# Patient Record
Sex: MALE | URBAN - METROPOLITAN AREA
[De-identification: names, ages, dates, MRNs, and addresses within clinical notes are randomized per-mention and may not be internally consistent; named-entity substitution may affect disease eponyms.]

---

## 2017-08-15 ENCOUNTER — IMPORTED ENCOUNTER (OUTPATIENT)
Dept: URBAN - METROPOLITAN AREA CLINIC 31 | Facility: CLINIC | Age: 77
End: 2017-08-15

## 2017-08-15 PROBLEM — H35.3131: Noted: 2017-08-15

## 2017-08-15 PROBLEM — H25.813: Noted: 2017-08-15

## 2017-08-15 PROCEDURE — 99204 OFFICE O/P NEW MOD 45 MIN: CPT

## 2017-08-15 NOTE — PATIENT DISCUSSION
1.  Discussed the risks benefits alternatives and limitations of cataract surgery including infection bleeding loss of vision retinal tears detachment. The patient stated a full understanding and a desire to proceed with the procedure in both eyes. Refractive options were reviewed. Patient has elected to be optimized for distance vision in both eyes. The patient will still need glasses for reading and to possibly fine tune distance vision. 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 ARMD OU dry - Importance of smoking cessation blood pressure control and healthy diet were emphasized. In accordance with the AREDS study a good multivitamin containing EC and Zinc were recommened to be taken daily. Patient was instructed to self monitor their monocular vision (reading/[employer] Grid) at least weekly. Patient should immediately report any new onset of decreased vision or metamorphopsia. 3. Return for an appointment for [address] [place] [address] with Dr. Adrian Jarvis.

## 2017-10-03 ENCOUNTER — IMPORTED ENCOUNTER (OUTPATIENT)
Dept: URBAN - METROPOLITAN AREA CLINIC 31 | Facility: CLINIC | Age: 77
End: 2017-10-03

## 2017-10-03 PROBLEM — H25.813: Noted: 2017-10-03

## 2017-10-03 PROCEDURE — 76519 ECHO EXAM OF EYE: CPT

## 2017-10-17 ENCOUNTER — IMPORTED ENCOUNTER (OUTPATIENT)
Dept: URBAN - METROPOLITAN AREA CLINIC 31 | Facility: CLINIC | Age: 77
End: 2017-10-17

## 2017-10-17 PROBLEM — Z96.1: Noted: 2017-10-17

## 2017-10-17 PROCEDURE — 99024 POSTOP FOLLOW-UP VISIT: CPT

## 2017-10-17 NOTE — PATIENT DISCUSSION
Post-Op Day #1 - Cataract Surgery Right Eye (OD) - doing well. Tears prn. Continue postop drops as directed. Call office with symptoms of pain redness or decreased vision in operative eye. 1 drop zylet instilled. Ok to proceed with fellow eye. Return for an appointment in 1 week for post op refraction. with Dr. Jens Powell.

## 2017-10-25 ENCOUNTER — IMPORTED ENCOUNTER (OUTPATIENT)
Dept: URBAN - METROPOLITAN AREA CLINIC 31 | Facility: CLINIC | Age: 77
End: 2017-10-25

## 2017-10-25 PROBLEM — Z96.1: Noted: 2017-10-25

## 2017-10-25 PROCEDURE — 99024 POSTOP FOLLOW-UP VISIT: CPT

## 2017-10-25 NOTE — PATIENT DISCUSSION
Post-Op Week #1 - Cataract Surgery Right Eye (OD) - Intraocular lens stable and surgery very well healed. Patient to resume all normal activities. Finish postop drops as directed. Final Refraction given if necessary. Call with any problems.

## 2017-10-31 ENCOUNTER — IMPORTED ENCOUNTER (OUTPATIENT)
Dept: URBAN - METROPOLITAN AREA CLINIC 31 | Facility: CLINIC | Age: 77
End: 2017-10-31

## 2017-10-31 PROBLEM — Z96.1: Noted: 2017-10-31

## 2017-10-31 PROCEDURE — 99024 POSTOP FOLLOW-UP VISIT: CPT

## 2017-10-31 NOTE — PATIENT DISCUSSION
1.  Post-Op Day #1 - Cataract Surgery Left Eye (OS) - doing well. Tears prn. Continue postop drops as directed. Call office with symptoms of pain redness or decreased vision in operative eye.  1 drop of zylet instilled2. Post-Op Week #2 -  Cataract Surgery Right Eye (OD) - Intraocular lens stable and surgery very well healed. Patient to resume all normal activities. Finish postop drops as directed. Final Refraction given if necessary. 3. Return for an appointment in 1 week for post op exam. MRx. with Dr. Alvarado Rivera.

## 2017-11-07 ENCOUNTER — IMPORTED ENCOUNTER (OUTPATIENT)
Dept: URBAN - METROPOLITAN AREA CLINIC 31 | Facility: CLINIC | Age: 77
End: 2017-11-07

## 2017-11-07 PROBLEM — Z96.1: Noted: 2017-11-07

## 2017-11-07 PROCEDURE — 99024 POSTOP FOLLOW-UP VISIT: CPT

## 2017-11-07 NOTE — PATIENT DISCUSSION
1.  Post-Op Week #1 - Cataract Surgery Left Eye (OS) -  Intraocular lens stable and surgery very well healed. Patient to resume all normal activities. Finish postop drops as directed. Final Refraction given if necessary. Call with any problems. 2. Return for an appointment in 4 months for dilated fundus exam. with Dr. Jens Powell.

## 2018-03-19 ENCOUNTER — IMPORTED ENCOUNTER (OUTPATIENT)
Dept: URBAN - METROPOLITAN AREA CLINIC 31 | Facility: CLINIC | Age: 78
End: 2018-03-19

## 2018-03-19 PROBLEM — Z96.1: Noted: 2018-03-19

## 2018-03-19 PROCEDURE — 99214 OFFICE O/P EST MOD 30 MIN: CPT

## 2020-10-01 ENCOUNTER — OFFICE VISIT (OUTPATIENT)
Age: 80
End: 2020-10-01

## 2021-01-21 ENCOUNTER — OFFICE VISIT (OUTPATIENT)
Dept: URBAN - METROPOLITAN AREA CLINIC 7 | Facility: CLINIC | Age: 81
End: 2021-01-21

## 2021-02-10 ENCOUNTER — OFFICE VISIT (OUTPATIENT)
Dept: URBAN - METROPOLITAN AREA SURGERY CENTER 5 | Facility: SURGERY CENTER | Age: 81
End: 2021-02-10

## 2021-02-12 ENCOUNTER — TELEPHONE ENCOUNTER (OUTPATIENT)
Dept: URBAN - METROPOLITAN AREA CLINIC 9 | Facility: CLINIC | Age: 81
End: 2021-02-12

## 2021-05-18 ENCOUNTER — TELEPHONE ENCOUNTER (OUTPATIENT)
Dept: URBAN - METROPOLITAN AREA CLINIC 9 | Facility: CLINIC | Age: 81
End: 2021-05-18

## 2021-05-19 ENCOUNTER — TELEPHONE ENCOUNTER (OUTPATIENT)
Dept: URBAN - METROPOLITAN AREA CLINIC 9 | Facility: CLINIC | Age: 81
End: 2021-05-19

## 2021-10-27 NOTE — PATIENT DISCUSSION
Let him know that if he starts having any trouble with his contacts that we have room to go up. Since he isn't currently having issues not not making the change.

## 2022-04-02 ASSESSMENT — TONOMETRY
OS_IOP_MMHG: 21
OD_IOP_MMHG: 16
OD_IOP_MMHG: 17
OD_IOP_MMHG: 22
OS_IOP_MMHG: 15
OD_IOP_MMHG: 16
OS_IOP_MMHG: 16
OD_IOP_MMHG: 16
OD_IOP_MMHG: 14
OS_IOP_MMHG: 17

## 2022-04-02 ASSESSMENT — VISUAL ACUITY
OS_CC: 20/400
OD_CC: 20/20-2
OS_PH: SC 20/60 -2
OD_CC: 20/20
OD_CC: 20/30
OS_CC: 20/50
OD_CC: 20/200
OS_CC: 20/100-1
OS_GLARE: 20/50
OS_GLARE: 20/30MED
OS_PH: SC 20/60
OS_CC: 20/400
OU_CC: 20/20
OS_CC: 20/30
OD_GLARE: 20/60
OD_GLARE: 20/40MED
OD_CC: 20/25
OS_PH: SC 20/40 -3
OS_PH: SC 20/25
OD_CC: 20/25-1
OS_CC: 20/70

## 2022-07-30 ENCOUNTER — TELEPHONE ENCOUNTER (OUTPATIENT)
Age: 82
End: 2022-07-30

## 2022-07-30 RX ORDER — OMEPRAZOLE 20 MG/1
1 (ONE) CAPSULE, DELAYED RELEASE ORAL
Qty: 0 | Refills: 16 | OUTPATIENT
Start: 2018-05-17 | End: 2021-01-21

## 2022-07-31 ENCOUNTER — TELEPHONE ENCOUNTER (OUTPATIENT)
Age: 82
End: 2022-07-31

## 2022-07-31 RX ORDER — OMEPRAZOLE 20 MG/1
1 (ONE) CAPSULE, DELAYED RELEASE ORAL
Qty: 0 | Refills: 16 | Status: ACTIVE | COMMUNITY
Start: 2018-05-17

## 2022-07-31 RX ORDER — OMEPRAZOLE 20 MG/1
1 (ONE) CAPSULE, DELAYED RELEASE ORAL
Qty: 0 | Refills: 8 | Status: ACTIVE | COMMUNITY
Start: 2021-05-18

## 2022-07-31 RX ORDER — OMEPRAZOLE 20 MG/1
1 (ONE) CAPSULE, DELAYED RELEASE ORAL
Qty: 0 | Refills: 8 | Status: ACTIVE | COMMUNITY
Start: 2021-05-19

## 2023-01-24 ENCOUNTER — NEW PATIENT (OUTPATIENT)
Dept: URBAN - METROPOLITAN AREA CLINIC 26 | Facility: CLINIC | Age: 83
End: 2023-01-24

## 2023-01-24 VITALS
HEIGHT: 72 IN | HEART RATE: 47 BPM | BODY MASS INDEX: 27.63 KG/M2 | SYSTOLIC BLOOD PRESSURE: 157 MMHG | WEIGHT: 204 LBS | DIASTOLIC BLOOD PRESSURE: 70 MMHG

## 2023-01-24 DIAGNOSIS — H35.443: ICD-10-CM

## 2023-01-24 DIAGNOSIS — H43.813: ICD-10-CM

## 2023-01-24 DIAGNOSIS — H04.123: ICD-10-CM

## 2023-01-24 DIAGNOSIS — H35.3132: ICD-10-CM

## 2023-01-24 DIAGNOSIS — H35.61: ICD-10-CM

## 2023-01-24 PROCEDURE — 99204 OFFICE O/P NEW MOD 45 MIN: CPT

## 2023-01-24 PROCEDURE — 92235 FLUORESCEIN ANGRPH MLTIFRAME: CPT

## 2023-01-24 PROCEDURE — 92134 CPTRZ OPH DX IMG PST SGM RTA: CPT

## 2023-01-24 PROCEDURE — 92250 FUNDUS PHOTOGRAPHY W/I&R: CPT

## 2023-01-24 ASSESSMENT — TONOMETRY
OD_IOP_MMHG: 16
OS_IOP_MMHG: 17

## 2023-01-24 ASSESSMENT — VISUAL ACUITY
OS_PH: 20/30-1
OS_SC: 20/60-2
OD_SC: 20/25-2

## 2023-01-24 NOTE — PATIENT DISCUSSION
1/24/23: Recommended OBSERVATION. Patient understands condition, prognosis and need for follow up care.

## 2023-01-24 NOTE — PATIENT DISCUSSION
1/24/23: EVIDENCE OF A CHOROIDAL ELEVATION ON OCT THAT DOESN'T REPRESENT A MASS ON FA. WE'LL CONTINUE TO MONITOR TO EVAL IF A CHOROIDAL CLEFT FROM AMD? The patient is at high risk for a choroidal neovascular membrane. NO CNV SEEN TODAY. Dry ARMD is responsible for some decrease in vision.

## 2023-01-24 NOTE — PATIENT DISCUSSION
Recommend reevaluation in 3-4 MONTHS to makes sure not a sign of an EVOLVING CONDITIONS. PT IS MILDLY ANEMIC AND BG IS BORDERLINE HIGH. TO MONITOR WITH PCP.

## 2023-01-24 NOTE — PATIENT DISCUSSION
DISCUSSED THE DX, IMAGES, PROGNOSIS AND TX PLAN WITH PATIENT AND WIFE AS INDEPENDENT HISTORIAN. PT REQUIRES SX, RISK OF VI: MOD/HIGH. ALL QUESTIONS WERE ANSWERED.

## 2023-04-24 ENCOUNTER — FOLLOW UP (OUTPATIENT)
Dept: URBAN - METROPOLITAN AREA CLINIC 26 | Facility: CLINIC | Age: 83
End: 2023-04-24

## 2023-04-24 DIAGNOSIS — H35.61: ICD-10-CM

## 2023-04-24 DIAGNOSIS — H35.443: ICD-10-CM

## 2023-04-24 DIAGNOSIS — H35.3132: ICD-10-CM

## 2023-04-24 DIAGNOSIS — H43.813: ICD-10-CM

## 2023-04-24 DIAGNOSIS — H04.123: ICD-10-CM

## 2023-04-24 PROCEDURE — 92250 FUNDUS PHOTOGRAPHY W/I&R: CPT

## 2023-04-24 PROCEDURE — 92012 INTRM OPH EXAM EST PATIENT: CPT

## 2023-04-24 PROCEDURE — 92134 CPTRZ OPH DX IMG PST SGM RTA: CPT

## 2023-04-24 ASSESSMENT — VISUAL ACUITY
OS_PH: 20/50-1
OD_SC: 20/30-2
OS_SC: 20/50-2

## 2023-04-24 ASSESSMENT — TONOMETRY
OD_IOP_MMHG: 19
OS_IOP_MMHG: 15

## 2023-07-10 ENCOUNTER — FOLLOW UP (OUTPATIENT)
Dept: URBAN - METROPOLITAN AREA CLINIC 29 | Facility: CLINIC | Age: 83
End: 2023-07-10

## 2023-07-10 DIAGNOSIS — H04.123: ICD-10-CM

## 2023-07-10 DIAGNOSIS — H02.834: ICD-10-CM

## 2023-07-10 DIAGNOSIS — H02.831: ICD-10-CM

## 2023-07-10 DIAGNOSIS — H43.813: ICD-10-CM

## 2023-07-10 DIAGNOSIS — H26.492: ICD-10-CM

## 2023-07-10 DIAGNOSIS — H35.61: ICD-10-CM

## 2023-07-10 DIAGNOSIS — H35.3132: ICD-10-CM

## 2023-07-10 DIAGNOSIS — H35.443: ICD-10-CM

## 2023-07-10 DIAGNOSIS — Z96.1: ICD-10-CM

## 2023-07-10 PROCEDURE — 99214 OFFICE O/P EST MOD 30 MIN: CPT

## 2023-07-10 ASSESSMENT — TONOMETRY
OS_IOP_MMHG: 14
OD_IOP_MMHG: 15

## 2023-07-10 ASSESSMENT — VISUAL ACUITY
OS_SC: 20/80-1
OD_SC: 20/25+2

## 2024-02-08 ENCOUNTER — FOLLOW UP (OUTPATIENT)
Dept: URBAN - METROPOLITAN AREA CLINIC 26 | Facility: CLINIC | Age: 84
End: 2024-02-08

## 2024-02-08 VITALS — BODY MASS INDEX: 28.44 KG/M2 | HEIGHT: 72 IN | WEIGHT: 210 LBS

## 2024-02-08 DIAGNOSIS — H43.813: ICD-10-CM

## 2024-02-08 DIAGNOSIS — H35.61: ICD-10-CM

## 2024-02-08 DIAGNOSIS — H02.831: ICD-10-CM

## 2024-02-08 DIAGNOSIS — D31.32: ICD-10-CM

## 2024-02-08 DIAGNOSIS — H35.443: ICD-10-CM

## 2024-02-08 DIAGNOSIS — H35.3132: ICD-10-CM

## 2024-02-08 DIAGNOSIS — H02.834: ICD-10-CM

## 2024-02-08 DIAGNOSIS — H35.3221: ICD-10-CM

## 2024-02-08 DIAGNOSIS — H04.123: ICD-10-CM

## 2024-02-08 DIAGNOSIS — Z96.1: ICD-10-CM

## 2024-02-08 PROCEDURE — 92134 CPTRZ OPH DX IMG PST SGM RTA: CPT

## 2024-02-08 PROCEDURE — 67028 INJECTION EYE DRUG: CPT

## 2024-02-08 PROCEDURE — 92014 COMPRE OPH EXAM EST PT 1/>: CPT

## 2024-02-08 PROCEDURE — 92250 FUNDUS PHOTOGRAPHY W/I&R: CPT

## 2024-02-08 ASSESSMENT — VISUAL ACUITY
OD_PH: 20/25-2
OS_SC: 20/200+1
OS_PH: 20/60
OD_SC: 20/40-1

## 2024-02-08 ASSESSMENT — TONOMETRY
OD_IOP_MMHG: 12
OS_IOP_MMHG: 13

## 2024-03-14 ENCOUNTER — CLINIC PROCEDURE ONLY (OUTPATIENT)
Dept: URBAN - METROPOLITAN AREA CLINIC 26 | Facility: CLINIC | Age: 84
End: 2024-03-14

## 2024-03-14 DIAGNOSIS — H35.3221: ICD-10-CM

## 2024-03-14 DIAGNOSIS — H35.3132: ICD-10-CM

## 2024-03-14 PROCEDURE — 67028 INJECTION EYE DRUG: CPT

## 2024-03-14 PROCEDURE — 92134 CPTRZ OPH DX IMG PST SGM RTA: CPT

## 2024-03-14 ASSESSMENT — VISUAL ACUITY
OS_SC: 20/60-1
OS_PH: 20/40-2

## 2024-03-14 ASSESSMENT — TONOMETRY: OS_IOP_MMHG: 14

## 2024-04-25 ENCOUNTER — CLINIC PROCEDURE ONLY (OUTPATIENT)
Dept: URBAN - METROPOLITAN AREA CLINIC 26 | Facility: CLINIC | Age: 84
End: 2024-04-25

## 2024-04-25 DIAGNOSIS — H35.3132: ICD-10-CM

## 2024-04-25 DIAGNOSIS — H35.3221: ICD-10-CM

## 2024-04-25 PROCEDURE — 92134 CPTRZ OPH DX IMG PST SGM RTA: CPT

## 2024-04-25 PROCEDURE — 67028 INJECTION EYE DRUG: CPT

## 2024-04-25 ASSESSMENT — VISUAL ACUITY
OS_SC: 20/60-1
OS_PH: 20/40-2

## 2024-04-25 ASSESSMENT — TONOMETRY: OS_IOP_MMHG: 16

## 2024-06-06 ENCOUNTER — CLINIC PROCEDURE ONLY (OUTPATIENT)
Dept: URBAN - METROPOLITAN AREA CLINIC 26 | Facility: CLINIC | Age: 84
End: 2024-06-06

## 2024-06-06 DIAGNOSIS — H35.3132: ICD-10-CM

## 2024-06-06 DIAGNOSIS — H35.3221: ICD-10-CM

## 2024-06-06 PROCEDURE — 92250 FUNDUS PHOTOGRAPHY W/I&R: CPT | Mod: 59

## 2024-06-06 PROCEDURE — 67028 INJECTION EYE DRUG: CPT

## 2024-06-06 PROCEDURE — 92134 CPTRZ OPH DX IMG PST SGM RTA: CPT

## 2024-06-06 ASSESSMENT — TONOMETRY: OS_IOP_MMHG: 10

## 2024-06-06 ASSESSMENT — VISUAL ACUITY
OS_PH: 20/25-2
OS_SC: 20/60+2

## 2024-07-30 ENCOUNTER — CLINIC PROCEDURE ONLY (OUTPATIENT)
Dept: URBAN - METROPOLITAN AREA CLINIC 26 | Facility: CLINIC | Age: 84
End: 2024-07-30

## 2024-07-30 DIAGNOSIS — H35.3132: ICD-10-CM

## 2024-07-30 DIAGNOSIS — H35.3221: ICD-10-CM

## 2024-07-30 PROCEDURE — 92134 CPTRZ OPH DX IMG PST SGM RTA: CPT

## 2024-07-30 PROCEDURE — 67028 INJECTION EYE DRUG: CPT

## 2024-07-30 PROCEDURE — 92250 FUNDUS PHOTOGRAPHY W/I&R: CPT

## 2024-07-30 ASSESSMENT — VISUAL ACUITY
OS_SC: 20/60-2
OS_PH: 20/30-1

## 2024-07-30 ASSESSMENT — TONOMETRY: OS_IOP_MMHG: 15

## 2024-09-20 ENCOUNTER — CLINIC PROCEDURE ONLY (OUTPATIENT)
Dept: URBAN - METROPOLITAN AREA CLINIC 26 | Facility: CLINIC | Age: 84
End: 2024-09-20

## 2024-09-20 DIAGNOSIS — H35.3221: ICD-10-CM

## 2024-09-20 DIAGNOSIS — H35.3132: ICD-10-CM

## 2024-09-20 PROCEDURE — 92134 CPTRZ OPH DX IMG PST SGM RTA: CPT

## 2024-09-20 PROCEDURE — 92250 FUNDUS PHOTOGRAPHY W/I&R: CPT

## 2024-09-20 PROCEDURE — 67028 INJECTION EYE DRUG: CPT

## 2024-11-01 ENCOUNTER — CLINIC PROCEDURE ONLY (OUTPATIENT)
Dept: URBAN - METROPOLITAN AREA CLINIC 26 | Facility: CLINIC | Age: 84
End: 2024-11-01

## 2024-11-01 DIAGNOSIS — H35.3132: ICD-10-CM

## 2024-11-01 DIAGNOSIS — H35.3221: ICD-10-CM

## 2024-11-01 PROCEDURE — 92134 CPTRZ OPH DX IMG PST SGM RTA: CPT

## 2024-11-01 PROCEDURE — 67028 INJECTION EYE DRUG: CPT

## 2024-11-01 PROCEDURE — 92250 FUNDUS PHOTOGRAPHY W/I&R: CPT

## 2024-12-20 ENCOUNTER — CLINIC PROCEDURE ONLY (OUTPATIENT)
Age: 84
End: 2024-12-20

## 2024-12-20 DIAGNOSIS — H35.3221: ICD-10-CM

## 2024-12-20 DIAGNOSIS — H35.3132: ICD-10-CM

## 2024-12-20 PROCEDURE — 67028 INJECTION EYE DRUG: CPT

## 2024-12-20 PROCEDURE — J2777PFS VABYSMO PFS: Mod: JZ

## 2024-12-20 PROCEDURE — 92250 FUNDUS PHOTOGRAPHY W/I&R: CPT

## 2024-12-20 PROCEDURE — 92134 CPTRZ OPH DX IMG PST SGM RTA: CPT

## 2025-02-07 ENCOUNTER — CLINIC PROCEDURE ONLY (OUTPATIENT)
Age: 85
End: 2025-02-07

## 2025-02-07 DIAGNOSIS — H35.3132: ICD-10-CM

## 2025-02-07 DIAGNOSIS — H35.3221: ICD-10-CM

## 2025-02-07 PROCEDURE — 92134 CPTRZ OPH DX IMG PST SGM RTA: CPT

## 2025-02-07 PROCEDURE — J2777PFS VABYSMO PFS: Mod: JZ

## 2025-02-07 PROCEDURE — 92250 FUNDUS PHOTOGRAPHY W/I&R: CPT

## 2025-02-07 PROCEDURE — 67028 INJECTION EYE DRUG: CPT

## 2025-02-12 ENCOUNTER — OFFICE VISIT (OUTPATIENT)
Dept: URBAN - METROPOLITAN AREA CLINIC 9 | Facility: CLINIC | Age: 85
End: 2025-02-12
Payer: MEDICARE

## 2025-02-12 ENCOUNTER — DASHBOARD ENCOUNTERS (OUTPATIENT)
Age: 85
End: 2025-02-12

## 2025-02-12 VITALS
HEIGHT: 72 IN | SYSTOLIC BLOOD PRESSURE: 126 MMHG | WEIGHT: 213 LBS | DIASTOLIC BLOOD PRESSURE: 72 MMHG | BODY MASS INDEX: 28.85 KG/M2

## 2025-02-12 DIAGNOSIS — K62.5 PAINLESS RECTAL BLEEDING: ICD-10-CM

## 2025-02-12 DIAGNOSIS — R13.19 CERVICAL DYSPHAGIA: ICD-10-CM

## 2025-02-12 DIAGNOSIS — K22.70 BARRETT ESOPHAGUS: ICD-10-CM

## 2025-02-12 PROCEDURE — 99214 OFFICE O/P EST MOD 30 MIN: CPT | Performed by: PHYSICIAN ASSISTANT

## 2025-02-12 RX ORDER — FINASTERIDE 5 MG/1
1 TABLET TABLET, FILM COATED ORAL ONCE A DAY
Status: ACTIVE | COMMUNITY

## 2025-02-12 RX ORDER — LEVOTHYROXINE SODIUM 100 UG/1
1 TABLET IN THE MORNING ON AN EMPTY STOMACH TABLET ORAL ONCE A DAY
Status: ACTIVE | COMMUNITY

## 2025-02-12 RX ORDER — OMEPRAZOLE 20 MG/1
1 (ONE) CAPSULE, DELAYED RELEASE ORAL
Qty: 0 | Refills: 8 | Status: DISCONTINUED | COMMUNITY
Start: 2021-05-19

## 2025-02-12 RX ORDER — TADALAFIL 5 MG/1
1 TABLET AS NEEDED TABLET, FILM COATED ORAL ONCE A DAY
Status: ACTIVE | COMMUNITY

## 2025-02-12 RX ORDER — TESTOSTERONE CYPIONATE 200 MG/ML
1 ML INJECTION INTRAMUSCULAR
Status: ACTIVE | COMMUNITY

## 2025-02-12 RX ORDER — PRAVASTATIN SODIUM 40 MG/1
1 TABLET TABLET ORAL ONCE A DAY
Status: ACTIVE | COMMUNITY

## 2025-02-12 RX ORDER — DABIGATRAN ETEXILATE MESYLATE 150 MG/1
AS DIRECTED PELLET ORAL
Status: ACTIVE | COMMUNITY

## 2025-02-12 RX ORDER — METOPROLOL TARTRATE 25 MG/1
1 TABLET WITH FOOD TABLET, FILM COATED ORAL TWICE A DAY
Status: ACTIVE | COMMUNITY

## 2025-02-12 RX ORDER — EMPAGLIFLOZIN 10 MG/1
1 TABLET TABLET, FILM COATED ORAL ONCE A DAY
Status: ACTIVE | COMMUNITY

## 2025-02-12 RX ORDER — LOSARTAN POTASSIUM 100 MG/1
1 TABLET TABLET, FILM COATED ORAL ONCE A DAY
Status: ACTIVE | COMMUNITY

## 2025-02-12 RX ORDER — DOFETILIDE 0.25 MG/1
1 CAPSULE CAPSULE ORAL TWICE A DAY
Status: ACTIVE | COMMUNITY

## 2025-02-12 RX ORDER — PANTOPRAZOLE SODIUM 40 MG/1
1 TABLET 1/2 TO 1 HOUR BEFORE MORNING MEAL TABLET, DELAYED RELEASE ORAL ONCE A DAY
Qty: 90 | Refills: 3 | OUTPATIENT
Start: 2025-02-12

## 2025-02-12 RX ORDER — NIFEDIPINE 30 MG/1
1 TABLET ON AN EMPTY STOMACH TABLET, EXTENDED RELEASE ORAL ONCE A DAY
Status: ACTIVE | COMMUNITY

## 2025-02-12 NOTE — HPI-TODAY'S VISIT:
85-year-old male, last seen in office 2021, with rectal bleeding.    Colonoscopy 12/2017 internal hemorrhoids, diverticulosis sigmoid colon.  Advise repeat in 5 years Flex sig-Dr. Moore-2021 prep was poor, anal fissure found on perianal exam.  Internal hemorrhoids. EGD Dr. Moore 2018 mild acute and chronic inflammation, focal intestinal metaplasia identified.  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 10/2012 ultrasound gallstone present, dilation of CBD measuring 8.3 mm  Patient denies pacemaker or ICD or other implanted wired device, no CAD stents, seizure disorders,  severe pulmonary disease, prior issues with anesthesia.  Has a loop recorder in place.  On Pradaxa for AF. Uses CPAP.  WIll obtain CC.   Pt with hx of Vieira's dx 2018 and he has not been taking PPi or had f/u EGD as advsied.  He actually tells me that he doesn't even know what I am talking about.  He did have a rectal fissure in 2021 that he doesn't remember either.  Has not been having any issues with rectal pain or bleeding until just last week.   Pt presents today due to this recent single of BRBPR. He wiped, then stood up, then passed a small amount of BRB.  Has not ocurred since.  No rectal pain.  No unintentional wegiht loss.  No abdominal pain. No rectal pruritis. No tenesmus. He is not interested in f/u of this as has not occurred again.  Rarely, some dysphagia with solids - he just drinks water or wine and goes away.  I have advised resuming a PPI immediately and will arrange for EGD wtih dilation , CC prior.  Dr. Julian. Pt agreeable to proceeding with this.

## 2025-03-06 ENCOUNTER — OFFICE VISIT (OUTPATIENT)
Dept: URBAN - METROPOLITAN AREA CLINIC 9 | Facility: CLINIC | Age: 85
End: 2025-03-06

## 2025-03-27 NOTE — PATIENT DISCUSSION
ARTIFICIAL TEARS to affected eye(s) as needed. [FreeTextEntry1] : [facility]. [phone] [address] [place] [address]  CC: Surveillance for G1 EMCA

## 2025-03-28 ENCOUNTER — FOLLOW UP (OUTPATIENT)
Age: 85
End: 2025-03-28

## 2025-03-28 DIAGNOSIS — H04.123: ICD-10-CM

## 2025-03-28 DIAGNOSIS — H35.61: ICD-10-CM

## 2025-03-28 DIAGNOSIS — D31.32: ICD-10-CM

## 2025-03-28 DIAGNOSIS — H02.834: ICD-10-CM

## 2025-03-28 DIAGNOSIS — H02.831: ICD-10-CM

## 2025-03-28 DIAGNOSIS — H35.3112: ICD-10-CM

## 2025-03-28 DIAGNOSIS — H43.813: ICD-10-CM

## 2025-03-28 DIAGNOSIS — H35.443: ICD-10-CM

## 2025-03-28 DIAGNOSIS — Z96.1: ICD-10-CM

## 2025-03-28 DIAGNOSIS — H35.3221: ICD-10-CM

## 2025-03-28 PROCEDURE — 67028 INJECTION EYE DRUG: CPT

## 2025-03-28 PROCEDURE — J2777PFS VABYSMO PFS: Mod: JZ

## 2025-03-28 PROCEDURE — 92134 CPTRZ OPH DX IMG PST SGM RTA: CPT

## 2025-03-28 PROCEDURE — 92250 FUNDUS PHOTOGRAPHY W/I&R: CPT | Mod: 59

## 2025-03-28 PROCEDURE — 92014 COMPRE OPH EXAM EST PT 1/>: CPT

## 2025-04-01 ENCOUNTER — CLAIMS CREATED FROM THE CLAIM WINDOW (OUTPATIENT)
Dept: URBAN - METROPOLITAN AREA SURGERY CENTER 9 | Facility: SURGERY CENTER | Age: 85
End: 2025-04-01
Payer: MEDICARE

## 2025-04-01 ENCOUNTER — CLAIMS CREATED FROM THE CLAIM WINDOW (OUTPATIENT)
Dept: URBAN - METROPOLITAN AREA CLINIC 4 | Facility: CLINIC | Age: 85
End: 2025-04-01
Payer: MEDICARE

## 2025-04-01 ENCOUNTER — TELEPHONE ENCOUNTER (OUTPATIENT)
Dept: URBAN - METROPOLITAN AREA CLINIC 7 | Facility: CLINIC | Age: 85
End: 2025-04-01

## 2025-04-01 DIAGNOSIS — K22.89 OTHER SPECIFIED DISEASE OF ESOPHAGUS: ICD-10-CM

## 2025-04-01 DIAGNOSIS — K31.89 OTHER DISEASES OF STOMACH AND DUODENUM: ICD-10-CM

## 2025-04-01 DIAGNOSIS — K21.9 GASTRO-ESOPHAGEAL REFLUX DISEASE WITHOUT ESOPHAGITIS: ICD-10-CM

## 2025-04-01 DIAGNOSIS — K44.9 DIAPHRAGMATIC HERNIA WITHOUT OBSTRUCTION OR GANGRENE: ICD-10-CM

## 2025-04-01 DIAGNOSIS — K25.9 GASTRIC ULCER: ICD-10-CM

## 2025-04-01 DIAGNOSIS — K44.9 HIATAL HERNIA: ICD-10-CM

## 2025-04-01 DIAGNOSIS — K25.7 CHRONIC GASTRIC ULCER WITHOUT HEMORRHAGE OR PERFORATION: ICD-10-CM

## 2025-04-01 DIAGNOSIS — K25.9 GASTRIC ULCER WITHOUT HEMORRHAGE OR PERFORATION, UNSPECIFIED CHRONICITY: ICD-10-CM

## 2025-04-01 DIAGNOSIS — K31.89 GASTRIC FOVEOLAR HYPERPLASIA: ICD-10-CM

## 2025-04-01 PROCEDURE — 88305 TISSUE EXAM BY PATHOLOGIST: CPT | Performed by: PATHOLOGY

## 2025-04-01 PROCEDURE — 00731 ANES UPR GI NDSC PX NOS: CPT | Performed by: NURSE ANESTHETIST, CERTIFIED REGISTERED

## 2025-04-01 PROCEDURE — 88312 SPECIAL STAINS GROUP 1: CPT | Performed by: PATHOLOGY

## 2025-04-01 PROCEDURE — 43239 EGD BIOPSY SINGLE/MULTIPLE: CPT | Performed by: INTERNAL MEDICINE

## 2025-04-01 PROCEDURE — 43239 EGD BIOPSY SINGLE/MULTIPLE: CPT | Performed by: CLINIC/CENTER

## 2025-04-01 RX ORDER — EMPAGLIFLOZIN 10 MG/1
1 TABLET TABLET, FILM COATED ORAL ONCE A DAY
Status: ACTIVE | COMMUNITY

## 2025-04-01 RX ORDER — TADALAFIL 5 MG/1
1 TABLET AS NEEDED TABLET, FILM COATED ORAL ONCE A DAY
Status: ACTIVE | COMMUNITY

## 2025-04-01 RX ORDER — METOPROLOL TARTRATE 25 MG/1
1 TABLET WITH FOOD TABLET, FILM COATED ORAL TWICE A DAY
Status: ACTIVE | COMMUNITY

## 2025-04-01 RX ORDER — SUCRALFATE 1 G/1
1 TABLET ON AN EMPTY STOMACH TABLET ORAL TWICE A DAY
Qty: 60 | Refills: 0
Start: 2025-04-01

## 2025-04-01 RX ORDER — DABIGATRAN ETEXILATE MESYLATE 150 MG/1
AS DIRECTED PELLET ORAL
Status: ACTIVE | COMMUNITY

## 2025-04-01 RX ORDER — NIFEDIPINE 30 MG/1
1 TABLET ON AN EMPTY STOMACH TABLET, EXTENDED RELEASE ORAL ONCE A DAY
Status: ACTIVE | COMMUNITY

## 2025-04-01 RX ORDER — LOSARTAN POTASSIUM 100 MG/1
1 TABLET TABLET, FILM COATED ORAL ONCE A DAY
Status: ACTIVE | COMMUNITY

## 2025-04-01 RX ORDER — LEVOTHYROXINE SODIUM 100 UG/1
1 TABLET IN THE MORNING ON AN EMPTY STOMACH TABLET ORAL ONCE A DAY
Status: ACTIVE | COMMUNITY

## 2025-04-01 RX ORDER — DOFETILIDE 0.25 MG/1
1 CAPSULE CAPSULE ORAL TWICE A DAY
Status: ACTIVE | COMMUNITY

## 2025-04-01 RX ORDER — PANTOPRAZOLE SODIUM 40 MG/1
1 TABLET 1/2 TO 1 HOUR BEFORE MORNING MEAL TABLET, DELAYED RELEASE ORAL ONCE A DAY
Qty: 90 | Refills: 3 | Status: ACTIVE | COMMUNITY
Start: 2025-02-12

## 2025-04-01 RX ORDER — TESTOSTERONE CYPIONATE 200 MG/ML
1 ML INJECTION INTRAMUSCULAR
Status: ACTIVE | COMMUNITY

## 2025-04-01 RX ORDER — FINASTERIDE 5 MG/1
1 TABLET TABLET, FILM COATED ORAL ONCE A DAY
Status: ACTIVE | COMMUNITY

## 2025-04-01 RX ORDER — SUCRALFATE 1 G/1
1 TABLET ON AN EMPTY STOMACH TABLET ORAL TWICE A DAY
Qty: 60 | OUTPATIENT
Start: 2025-04-01

## 2025-04-01 RX ORDER — PRAVASTATIN SODIUM 40 MG/1
1 TABLET TABLET ORAL ONCE A DAY
Status: ACTIVE | COMMUNITY

## 2025-04-15 ENCOUNTER — TELEPHONE ENCOUNTER (OUTPATIENT)
Dept: URBAN - METROPOLITAN AREA CLINIC 9 | Facility: CLINIC | Age: 85
End: 2025-04-15

## 2025-04-15 RX ORDER — PANTOPRAZOLE SODIUM 40 MG/1
1 TABLET TABLET, DELAYED RELEASE ORAL
Qty: 60 | Refills: 0 | OUTPATIENT
Start: 2025-04-22

## 2025-05-12 ENCOUNTER — OFFICE VISIT (OUTPATIENT)
Dept: URBAN - METROPOLITAN AREA CLINIC 9 | Facility: CLINIC | Age: 85
End: 2025-05-12

## 2025-05-12 ENCOUNTER — OFFICE VISIT (OUTPATIENT)
Dept: URBAN - METROPOLITAN AREA CLINIC 9 | Facility: CLINIC | Age: 85
End: 2025-05-12
Payer: MEDICARE

## 2025-05-12 DIAGNOSIS — K22.70 BARRETT ESOPHAGUS: ICD-10-CM

## 2025-05-12 DIAGNOSIS — K25.9 GASTRIC ULCER WITHOUT HEMORRHAGE OR PERFORATION, UNSPECIFIED CHRONICITY: ICD-10-CM

## 2025-05-12 DIAGNOSIS — R15.9 FULL INCONTINENCE OF FECES: ICD-10-CM

## 2025-05-12 DIAGNOSIS — K62.5 PAINLESS RECTAL BLEEDING: ICD-10-CM

## 2025-05-12 PROBLEM — 73481001: Status: ACTIVE | Noted: 2025-05-12

## 2025-05-12 PROBLEM — 1086911000119107: Status: ACTIVE | Noted: 2025-05-12

## 2025-05-12 PROCEDURE — 99214 OFFICE O/P EST MOD 30 MIN: CPT | Performed by: PHYSICIAN ASSISTANT

## 2025-05-12 RX ORDER — PRAVASTATIN SODIUM 40 MG/1
1 TABLET TABLET ORAL ONCE A DAY
Status: ACTIVE | COMMUNITY

## 2025-05-12 RX ORDER — FINASTERIDE 5 MG/1
1 TABLET TABLET, FILM COATED ORAL ONCE A DAY
Status: ACTIVE | COMMUNITY

## 2025-05-12 RX ORDER — SUCRALFATE 1 G/1
1 TABLET ON AN EMPTY STOMACH TABLET ORAL TWICE A DAY
Qty: 60 | Refills: 0 | Status: ACTIVE | COMMUNITY
Start: 2025-04-01

## 2025-05-12 RX ORDER — LOSARTAN POTASSIUM 100 MG/1
1 TABLET TABLET, FILM COATED ORAL ONCE A DAY
Status: ACTIVE | COMMUNITY

## 2025-05-12 RX ORDER — LEVOTHYROXINE SODIUM 100 UG/1
1 TABLET IN THE MORNING ON AN EMPTY STOMACH TABLET ORAL ONCE A DAY
Status: ACTIVE | COMMUNITY

## 2025-05-12 RX ORDER — EMPAGLIFLOZIN 10 MG/1
1 TABLET TABLET, FILM COATED ORAL ONCE A DAY
Status: ACTIVE | COMMUNITY

## 2025-05-12 RX ORDER — DABIGATRAN ETEXILATE MESYLATE 150 MG/1
AS DIRECTED PELLET ORAL
Status: ACTIVE | COMMUNITY

## 2025-05-12 RX ORDER — TESTOSTERONE CYPIONATE 200 MG/ML
1 ML INJECTION INTRAMUSCULAR
Status: ACTIVE | COMMUNITY

## 2025-05-12 RX ORDER — DOFETILIDE 0.25 MG/1
1 CAPSULE CAPSULE ORAL TWICE A DAY
Status: ACTIVE | COMMUNITY

## 2025-05-12 RX ORDER — NIFEDIPINE 30 MG/1
1 TABLET ON AN EMPTY STOMACH TABLET, EXTENDED RELEASE ORAL ONCE A DAY
Status: ACTIVE | COMMUNITY

## 2025-05-12 RX ORDER — TADALAFIL 5 MG/1
1 TABLET AS NEEDED TABLET, FILM COATED ORAL ONCE A DAY
Status: ACTIVE | COMMUNITY

## 2025-05-12 RX ORDER — METOPROLOL TARTRATE 25 MG/1
1 TABLET WITH FOOD TABLET, FILM COATED ORAL TWICE A DAY
Status: ACTIVE | COMMUNITY

## 2025-05-12 RX ORDER — PANTOPRAZOLE SODIUM 40 MG/1
1 TABLET 1/2 TO 1 HOUR BEFORE MORNING MEAL TABLET, DELAYED RELEASE ORAL ONCE A DAY
Qty: 90 | Refills: 3 | Status: ACTIVE | COMMUNITY
Start: 2025-02-12

## 2025-05-12 RX ORDER — PANTOPRAZOLE SODIUM 40 MG/1
1 TABLET TABLET, DELAYED RELEASE ORAL
Qty: 60 | Refills: 0 | Status: ACTIVE | COMMUNITY
Start: 2025-04-22

## 2025-05-12 NOTE — HPI-TODAY'S VISIT:
85-year-old male, last seen in office 2021, with rectal bleeding.    Colonoscopy 12/2017 internal hemorrhoids, diverticulosis sigmoid colon.  Advise repeat in 5 years Flex sig-Dr. Moore-2021 prep was poor, anal fissure found on perianal exam.  Internal hemorrhoids. 2018 EGD mild acute and chronic inflammation, focal intestinal metaplasia identified. 1/2025-EGD biopsy with foveolar hyperplasia of stomach and reflux type changes of esophagus.  No Vieira's.  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 10/2012 ultrasound gallstone present, dilation of CBD measuring 8.3 mm  Interim visit 5/12/2025. Pt here today for 3 month f/u. Since last visit, EGD revealed small gastric ulcer - 3 ml f/u EGD advised. He continues PPI.  In terms of Vieira's, advised continuous use of PPI and adherence to antireflux precautions. In terms of BRPBR/hx of hemorrhoids, daily fiber advised at time of last visit.  Still with occasional BRBPR both on TP and occ in stool as well.  Occurs periodically.   Discussed flex sig or virtual colonoscopy if returned. He is having some issues with urgency and fecal incontinance.  Urgency occurs more often, but incontinance only about every 2 weeks. He is using a scoop of finber a day.  No diarrhea.  No abd pain, cramping.  Will double the fiber to see if can help with incontinence.    Discussed possible etiologies of fecal incontinence - pelvic floor dysfunction, rectal pathology.  He is not intereseted in MR defacography or ARM.  Discussed possible trial with sacral nerve stimulator if increasing dose of fiber not helpful.Once he realized there was an external monitoring device, he was less interested.   RTC 3 months.  He is in recall for EGD 7/2025 to f/u on gastric ulcer. Advised cont PPI.

## 2025-05-22 ENCOUNTER — CLINIC PROCEDURE ONLY (OUTPATIENT)
Age: 85
End: 2025-05-22

## 2025-05-22 DIAGNOSIS — H35.3221: ICD-10-CM

## 2025-05-22 DIAGNOSIS — H35.3112: ICD-10-CM

## 2025-05-22 PROCEDURE — 92134 CPTRZ OPH DX IMG PST SGM RTA: CPT

## 2025-05-22 PROCEDURE — 92250 FUNDUS PHOTOGRAPHY W/I&R: CPT

## 2025-05-22 PROCEDURE — 67028 INJECTION EYE DRUG: CPT

## 2025-05-22 PROCEDURE — J2777PFS VABYSMO PFS: Mod: JZ,LT

## 2025-06-13 ENCOUNTER — TELEPHONE ENCOUNTER (OUTPATIENT)
Dept: URBAN - METROPOLITAN AREA CLINIC 9 | Facility: CLINIC | Age: 85
End: 2025-06-13

## 2025-06-20 ENCOUNTER — LAB OUTSIDE AN ENCOUNTER (OUTPATIENT)
Dept: URBAN - METROPOLITAN AREA CLINIC 9 | Facility: CLINIC | Age: 85
End: 2025-06-20

## 2025-06-20 ENCOUNTER — TELEPHONE ENCOUNTER (OUTPATIENT)
Dept: URBAN - METROPOLITAN AREA CLINIC 9 | Facility: CLINIC | Age: 85
End: 2025-06-20

## 2025-07-09 ENCOUNTER — TELEPHONE ENCOUNTER (OUTPATIENT)
Dept: URBAN - METROPOLITAN AREA CLINIC 9 | Facility: CLINIC | Age: 85
End: 2025-07-09

## 2025-07-18 ENCOUNTER — TELEPHONE ENCOUNTER (OUTPATIENT)
Dept: URBAN - METROPOLITAN AREA CLINIC 9 | Facility: CLINIC | Age: 85
End: 2025-07-18

## 2025-07-23 ENCOUNTER — TELEPHONE ENCOUNTER (OUTPATIENT)
Dept: URBAN - METROPOLITAN AREA SURGERY CENTER 9 | Facility: SURGERY CENTER | Age: 85
End: 2025-07-23

## 2025-07-24 ENCOUNTER — TELEPHONE ENCOUNTER (OUTPATIENT)
Dept: URBAN - METROPOLITAN AREA CLINIC 7 | Facility: CLINIC | Age: 85
End: 2025-07-24

## 2025-07-25 ENCOUNTER — CLINIC PROCEDURE ONLY (OUTPATIENT)
Age: 85
End: 2025-07-25

## 2025-07-25 DIAGNOSIS — H35.3112: ICD-10-CM

## 2025-07-25 DIAGNOSIS — H35.3221: ICD-10-CM

## 2025-07-25 PROCEDURE — 92134 CPTRZ OPH DX IMG PST SGM RTA: CPT

## 2025-07-25 PROCEDURE — 67028 INJECTION EYE DRUG: CPT

## 2025-07-25 PROCEDURE — 92250 FUNDUS PHOTOGRAPHY W/I&R: CPT

## 2025-07-25 PROCEDURE — J2777PFS VABYSMO PFS: Mod: JZ

## 2025-07-28 ENCOUNTER — LAB OUTSIDE AN ENCOUNTER (OUTPATIENT)
Dept: URBAN - METROPOLITAN AREA CLINIC 7 | Facility: CLINIC | Age: 85
End: 2025-07-28

## 2025-08-04 ENCOUNTER — CLAIMS CREATED FROM THE CLAIM WINDOW (OUTPATIENT)
Dept: URBAN - METROPOLITAN AREA SURGERY CENTER 9 | Facility: SURGERY CENTER | Age: 85
End: 2025-08-04
Payer: MEDICARE

## 2025-08-04 ENCOUNTER — CLAIMS CREATED FROM THE CLAIM WINDOW (OUTPATIENT)
Dept: URBAN - METROPOLITAN AREA CLINIC 4 | Facility: CLINIC | Age: 85
End: 2025-08-04
Payer: MEDICARE

## 2025-08-04 DIAGNOSIS — K44.9 HIATAL HERNIA: ICD-10-CM

## 2025-08-04 DIAGNOSIS — K31.89 OTHER DISEASES OF STOMACH AND DUODENUM: ICD-10-CM

## 2025-08-04 DIAGNOSIS — K21.9 GASTRO-ESOPHAGEAL REFLUX DISEASE WITHOUT ESOPHAGITIS: ICD-10-CM

## 2025-08-04 DIAGNOSIS — K22.89 OTHER SPECIFIED DISEASE OF ESOPHAGUS: ICD-10-CM

## 2025-08-04 DIAGNOSIS — K29.70 GASTRITIS, UNSPECIFIED, WITHOUT BLEEDING: ICD-10-CM

## 2025-08-04 DIAGNOSIS — K31.89 REACTIVE GASTROPATHY: ICD-10-CM

## 2025-08-04 DIAGNOSIS — K44.9 DIAPHRAGMATIC HERNIA WITHOUT OBSTRUCTION OR GANGRENE: ICD-10-CM

## 2025-08-04 DIAGNOSIS — K20.90 ESOPHAGITIS, UNSPECIFIED WITHOUT BLEEDING: ICD-10-CM

## 2025-08-04 PROCEDURE — 88342 IMHCHEM/IMCYTCHM 1ST ANTB: CPT | Performed by: PATHOLOGY

## 2025-08-04 PROCEDURE — 43239 EGD BIOPSY SINGLE/MULTIPLE: CPT | Performed by: CLINIC/CENTER

## 2025-08-04 PROCEDURE — 88313 SPECIAL STAINS GROUP 2: CPT | Performed by: PATHOLOGY

## 2025-08-04 PROCEDURE — 00731 ANES UPR GI NDSC PX NOS: CPT | Performed by: NURSE ANESTHETIST, CERTIFIED REGISTERED

## 2025-08-04 PROCEDURE — 88305 TISSUE EXAM BY PATHOLOGIST: CPT | Performed by: PATHOLOGY

## 2025-08-04 PROCEDURE — 88312 SPECIAL STAINS GROUP 1: CPT | Performed by: PATHOLOGY

## 2025-08-04 PROCEDURE — 43239 EGD BIOPSY SINGLE/MULTIPLE: CPT | Performed by: INTERNAL MEDICINE

## 2025-08-04 RX ORDER — PANTOPRAZOLE SODIUM 40 MG/1
1 TABLET TABLET, DELAYED RELEASE ORAL
Qty: 60 | Refills: 0 | Status: ACTIVE | COMMUNITY
Start: 2025-04-22

## 2025-08-04 RX ORDER — EMPAGLIFLOZIN 10 MG/1
1 TABLET TABLET, FILM COATED ORAL ONCE A DAY
Status: ACTIVE | COMMUNITY

## 2025-08-04 RX ORDER — LOSARTAN POTASSIUM 100 MG/1
1 TABLET TABLET, FILM COATED ORAL ONCE A DAY
Status: ACTIVE | COMMUNITY

## 2025-08-04 RX ORDER — LEVOTHYROXINE SODIUM 100 UG/1
1 TABLET IN THE MORNING ON AN EMPTY STOMACH TABLET ORAL ONCE A DAY
Status: ACTIVE | COMMUNITY

## 2025-08-04 RX ORDER — TESTOSTERONE CYPIONATE 200 MG/ML
1 ML INJECTION INTRAMUSCULAR
Status: ACTIVE | COMMUNITY

## 2025-08-04 RX ORDER — DABIGATRAN ETEXILATE MESYLATE 150 MG/1
AS DIRECTED PELLET ORAL
Status: ACTIVE | COMMUNITY

## 2025-08-04 RX ORDER — PRAVASTATIN SODIUM 40 MG/1
1 TABLET TABLET ORAL ONCE A DAY
Status: ACTIVE | COMMUNITY

## 2025-08-04 RX ORDER — METOPROLOL TARTRATE 25 MG/1
1 TABLET WITH FOOD TABLET, FILM COATED ORAL TWICE A DAY
Status: ACTIVE | COMMUNITY

## 2025-08-04 RX ORDER — PANTOPRAZOLE SODIUM 40 MG/1
1 TABLET 1/2 TO 1 HOUR BEFORE MORNING MEAL TABLET, DELAYED RELEASE ORAL ONCE A DAY
Qty: 90 | Refills: 3 | Status: ACTIVE | COMMUNITY
Start: 2025-02-12

## 2025-08-04 RX ORDER — DOFETILIDE 0.25 MG/1
1 CAPSULE CAPSULE ORAL TWICE A DAY
Status: ACTIVE | COMMUNITY

## 2025-08-04 RX ORDER — NIFEDIPINE 30 MG/1
1 TABLET ON AN EMPTY STOMACH TABLET, EXTENDED RELEASE ORAL ONCE A DAY
Status: ACTIVE | COMMUNITY

## 2025-08-04 RX ORDER — TADALAFIL 5 MG/1
1 TABLET AS NEEDED TABLET, FILM COATED ORAL ONCE A DAY
Status: ACTIVE | COMMUNITY

## 2025-08-04 RX ORDER — SUCRALFATE 1 G/1
1 TABLET ON AN EMPTY STOMACH TABLET ORAL TWICE A DAY
Qty: 60 | Refills: 0 | Status: ACTIVE | COMMUNITY
Start: 2025-04-01

## 2025-08-04 RX ORDER — FINASTERIDE 5 MG/1
1 TABLET TABLET, FILM COATED ORAL ONCE A DAY
Status: ACTIVE | COMMUNITY

## 2025-08-12 ENCOUNTER — TELEPHONE ENCOUNTER (OUTPATIENT)
Dept: URBAN - METROPOLITAN AREA CLINIC 9 | Facility: CLINIC | Age: 85
End: 2025-08-12

## 2025-08-12 RX ORDER — PANTOPRAZOLE SODIUM 40 MG/1
1 TABLET 1/2 TO 1 HOUR BEFORE MORNING MEAL TABLET, DELAYED RELEASE ORAL ONCE A DAY
Qty: 90 | Refills: 3
Start: 2025-02-12

## 2025-08-15 ENCOUNTER — TELEPHONE ENCOUNTER (OUTPATIENT)
Dept: URBAN - METROPOLITAN AREA CLINIC 7 | Facility: CLINIC | Age: 85
End: 2025-08-15

## 2025-08-15 RX ORDER — PANTOPRAZOLE SODIUM 40 MG/1
1 TABLET 1/2 TO 1 HOUR BEFORE MORNING MEAL TABLET, DELAYED RELEASE ORAL ONCE A DAY
Qty: 90 | Refills: 3
Start: 2025-02-12

## 2025-08-18 ENCOUNTER — TELEPHONE ENCOUNTER (OUTPATIENT)
Dept: URBAN - METROPOLITAN AREA CLINIC 7 | Facility: CLINIC | Age: 85
End: 2025-08-18

## 2025-08-18 ENCOUNTER — CLAIMS CREATED FROM THE CLAIM WINDOW (OUTPATIENT)
Dept: URBAN - METROPOLITAN AREA SURGERY CENTER 9 | Facility: SURGERY CENTER | Age: 85
End: 2025-08-18
Payer: MEDICARE

## 2025-08-18 DIAGNOSIS — K62.5 HEMORRHAGE OF ANUS AND RECTUM: ICD-10-CM

## 2025-08-18 DIAGNOSIS — K64.3 FOURTH DEGREE HEMORRHOIDS: ICD-10-CM

## 2025-08-18 DIAGNOSIS — K62.5 RECTAL HEMORRHAGE: ICD-10-CM

## 2025-08-18 PROCEDURE — 45330 DIAGNOSTIC SIGMOIDOSCOPY: CPT | Performed by: CLINIC/CENTER

## 2025-08-18 PROCEDURE — 00811 ANES LWR INTST NDSC NOS: CPT | Performed by: NURSE ANESTHETIST, CERTIFIED REGISTERED

## 2025-08-18 PROCEDURE — 45330 DIAGNOSTIC SIGMOIDOSCOPY: CPT | Performed by: INTERNAL MEDICINE

## 2025-08-18 RX ORDER — DABIGATRAN ETEXILATE MESYLATE 150 MG/1
AS DIRECTED PELLET ORAL
Status: ACTIVE | COMMUNITY

## 2025-08-18 RX ORDER — PANTOPRAZOLE SODIUM 40 MG/1
1 TABLET TABLET, DELAYED RELEASE ORAL
Qty: 60 | Refills: 0 | Status: ACTIVE | COMMUNITY
Start: 2025-04-22

## 2025-08-18 RX ORDER — METOPROLOL TARTRATE 25 MG/1
1 TABLET WITH FOOD TABLET, FILM COATED ORAL TWICE A DAY
Status: ACTIVE | COMMUNITY

## 2025-08-18 RX ORDER — NIFEDIPINE 30 MG/1
1 TABLET ON AN EMPTY STOMACH TABLET, EXTENDED RELEASE ORAL ONCE A DAY
Status: ACTIVE | COMMUNITY

## 2025-08-18 RX ORDER — TESTOSTERONE CYPIONATE 200 MG/ML
1 ML INJECTION INTRAMUSCULAR
Status: ACTIVE | COMMUNITY

## 2025-08-18 RX ORDER — DOFETILIDE 0.25 MG/1
1 CAPSULE CAPSULE ORAL TWICE A DAY
Status: ACTIVE | COMMUNITY

## 2025-08-18 RX ORDER — LOSARTAN POTASSIUM 100 MG/1
1 TABLET TABLET, FILM COATED ORAL ONCE A DAY
Status: ACTIVE | COMMUNITY

## 2025-08-18 RX ORDER — TADALAFIL 5 MG/1
1 TABLET AS NEEDED TABLET, FILM COATED ORAL ONCE A DAY
Status: ACTIVE | COMMUNITY

## 2025-08-18 RX ORDER — LEVOTHYROXINE SODIUM 100 UG/1
1 TABLET IN THE MORNING ON AN EMPTY STOMACH TABLET ORAL ONCE A DAY
Status: ACTIVE | COMMUNITY

## 2025-08-18 RX ORDER — SUCRALFATE 1 G/1
1 TABLET ON AN EMPTY STOMACH TABLET ORAL TWICE A DAY
Qty: 60 | Refills: 0 | Status: ACTIVE | COMMUNITY
Start: 2025-04-01

## 2025-08-18 RX ORDER — FINASTERIDE 5 MG/1
1 TABLET TABLET, FILM COATED ORAL ONCE A DAY
Status: ACTIVE | COMMUNITY

## 2025-08-18 RX ORDER — PRAVASTATIN SODIUM 40 MG/1
1 TABLET TABLET ORAL ONCE A DAY
Status: ACTIVE | COMMUNITY

## 2025-08-18 RX ORDER — EMPAGLIFLOZIN 10 MG/1
1 TABLET TABLET, FILM COATED ORAL ONCE A DAY
Status: ACTIVE | COMMUNITY

## 2025-08-18 RX ORDER — PANTOPRAZOLE SODIUM 40 MG/1
1 TABLET 1/2 TO 1 HOUR BEFORE MORNING MEAL TABLET, DELAYED RELEASE ORAL ONCE A DAY
Qty: 90 | Refills: 3 | Status: ACTIVE | COMMUNITY
Start: 2025-02-12